# Patient Record
Sex: FEMALE | Race: WHITE | NOT HISPANIC OR LATINO | Employment: UNEMPLOYED | URBAN - NONMETROPOLITAN AREA
[De-identification: names, ages, dates, MRNs, and addresses within clinical notes are randomized per-mention and may not be internally consistent; named-entity substitution may affect disease eponyms.]

---

## 2024-10-06 ENCOUNTER — HOSPITAL ENCOUNTER (EMERGENCY)
Facility: HOSPITAL | Age: 29
Discharge: HOME | End: 2024-10-06
Attending: EMERGENCY MEDICINE

## 2024-10-06 VITALS
WEIGHT: 248 LBS | HEART RATE: 95 BPM | DIASTOLIC BLOOD PRESSURE: 100 MMHG | BODY MASS INDEX: 39.86 KG/M2 | RESPIRATION RATE: 17 BRPM | SYSTOLIC BLOOD PRESSURE: 135 MMHG | OXYGEN SATURATION: 99 % | HEIGHT: 66 IN | TEMPERATURE: 97.5 F

## 2024-10-06 DIAGNOSIS — U07.1 COVID-19: Primary | ICD-10-CM

## 2024-10-06 LAB
FLUAV AG UPPER RESP QL IA.RAPID: NEGATIVE
FLUBV AG UPPER RESP QL IA.RAPID: NEGATIVE
SARS-COV+SARS-COV-2 AG RESP QL IA.RAPID: POSITIVE

## 2024-10-06 PROCEDURE — 96372 THER/PROPH/DIAG INJ SC/IM: CPT

## 2024-10-06 PROCEDURE — 99283 EMERGENCY DEPT VISIT LOW MDM: CPT

## 2024-10-06 PROCEDURE — 99284 EMERGENCY DEPT VISIT MOD MDM: CPT | Performed by: EMERGENCY MEDICINE

## 2024-10-06 PROCEDURE — 87804 INFLUENZA ASSAY W/OPTIC: CPT | Performed by: EMERGENCY MEDICINE

## 2024-10-06 PROCEDURE — 87811 SARS-COV-2 COVID19 W/OPTIC: CPT | Performed by: EMERGENCY MEDICINE

## 2024-10-06 RX ORDER — KETOROLAC TROMETHAMINE 30 MG/ML
15 INJECTION, SOLUTION INTRAMUSCULAR; INTRAVENOUS ONCE
Status: COMPLETED | OUTPATIENT
Start: 2024-10-06 | End: 2024-10-06

## 2024-10-06 RX ADMIN — KETOROLAC TROMETHAMINE 15 MG: 30 INJECTION, SOLUTION INTRAMUSCULAR at 15:26

## 2024-10-06 NOTE — ED PROVIDER NOTES
Final diagnoses:   COVID-19     ED Disposition       ED Disposition   Discharge    Condition   Stable    Date/Time   Sun Oct 6, 2024  3:11 PM    Comment   No Maguire discharge to home/self care.                   Assessment & Plan       Medical Decision Making  Patient presented to the emergency department and a MSE was performed. The patient was evaluated for complaint related to acute viral-like symptoms.  Patient is potentially at risk for, but not limited to, common cold, bronchitis, pneumonia, influenza, COVID.  Several of these diagnoses have been evaluated and ruled out by history and physical.  As needed, patient will be further evaluated with laboratory and imaging studies.  Higher level diagnostics, such as CT imaging or ultrasound, may also be required.  Please see work-up portion of the note for further evaluation of patient's risk.  Socioeconomic factors were also considered as part of the decision-making process.  Unless otherwise stated in the chart or patient is admitted as elsewhere documented, any previously prescribed medications will be maintained.      Problems Addressed:  COVID-19: self-limited or minor problem    Amount and/or Complexity of Data Reviewed  Labs: ordered. Decision-making details documented in ED Course.    Risk  Prescription drug management.        ED Course as of 10/06/24 1514   Sun Oct 06, 2024   1505 SARS COV Rapid Antigen(!): Positive       Medications   ketorolac (TORADOL) injection 15 mg (has no administration in time range)       ED Risk Strat Scores                           SBIRT 22yo+      Flowsheet Row Most Recent Value   Initial Alcohol Screen: US AUDIT-C     1. How often do you have a drink containing alcohol? 0 Filed at: 10/06/2024 1435   2. How many drinks containing alcohol do you have on a typical day you are drinking?  0 Filed at: 10/06/2024 1435   3b. FEMALE Any Age, or MALE 65+: How often do you have 4 or more drinks on one occassion? 0 Filed at:  10/06/2024 1435   Audit-C Score 0 Filed at: 10/06/2024 1435   JESSIE: How many times in the past year have you...    Used an illegal drug or used a prescription medication for non-medical reasons? Never Filed at: 10/06/2024 1435                            History of Present Illness       Chief Complaint   Patient presents with    Flu Symptoms     Yesterday started with flu symptoms -today feeling worse over last day       History reviewed. No pertinent past medical history.   History reviewed. No pertinent surgical history.   History reviewed. No pertinent family history.   Social History     Tobacco Use    Smoking status: Every Day     Current packs/day: 1.00     Types: Cigarettes     Passive exposure: Never    Smokeless tobacco: Never   Vaping Use    Vaping status: Never Used   Substance Use Topics    Alcohol use: Not Currently    Drug use: Not Currently      E-Cigarette/Vaping    E-Cigarette Use Never User       E-Cigarette/Vaping Substances    Nicotine No     Flavoring No       I have reviewed and agree with the history as documented.     29-year-old female presenting to the emergency room reporting that she has been ill since Wednesday with sore throat, congestion, headache no cough.  No nausea vomiting or reported fever.      History provided by:  Patient  Flu Symptoms  Presenting symptoms: cough, fatigue, headache and sore throat    Presenting symptoms: no diarrhea, no nausea, no shortness of breath and no vomiting    Associated symptoms: no congestion        Review of Systems   Constitutional:  Positive for fatigue.   HENT:  Positive for sore throat. Negative for congestion, trouble swallowing and voice change.    Respiratory:  Positive for cough. Negative for shortness of breath.    Gastrointestinal:  Negative for diarrhea, nausea and vomiting.   Neurological:  Positive for headaches.   All other systems reviewed and are negative.          Objective       ED Triage Vitals [10/06/24 1432]   Temperature Pulse  Blood Pressure Respirations SpO2 Patient Position - Orthostatic VS   97.5 °F (36.4 °C) 95 135/100 17 99 % --      Temp Source Heart Rate Source BP Location FiO2 (%) Pain Score    Temporal Monitor Left arm -- 3      Vitals      Date and Time Temp Pulse SpO2 Resp BP Pain Score FACES Pain Rating User   10/06/24 1432 97.5 °F (36.4 °C) 95 99 % 17 135/100 3 -- KM            Physical Exam  Vitals and nursing note reviewed.   Constitutional:       General: She is in acute distress.      Appearance: She is normal weight. She is not ill-appearing or toxic-appearing.   HENT:      Head: Normocephalic and atraumatic.      Right Ear: Tympanic membrane, ear canal and external ear normal. There is no impacted cerumen.      Left Ear: Tympanic membrane, ear canal and external ear normal. There is no impacted cerumen.      Nose: Congestion present.      Mouth/Throat:      Mouth: Mucous membranes are moist.   Eyes:      General:         Right eye: No discharge.         Left eye: No discharge.      Pupils: Pupils are equal, round, and reactive to light.   Pulmonary:      Effort: Pulmonary effort is normal. No respiratory distress.      Breath sounds: No wheezing, rhonchi or rales.   Abdominal:      General: Abdomen is flat. There is no distension.      Tenderness: There is no abdominal tenderness.   Musculoskeletal:         General: No signs of injury.   Skin:     Coloration: Skin is not pale.   Neurological:      General: No focal deficit present.      Mental Status: She is alert.   Psychiatric:         Mood and Affect: Mood normal.         Thought Content: Thought content normal.         Judgment: Judgment normal.         Results Reviewed       Procedure Component Value Units Date/Time    FLU/COVID Rapid Antigen (30 min. TAT) - Preferred screening test in ED [[id number]]  (Abnormal) Collected: 10/06/24 1437    Lab Status: Final result Specimen: Nares from Nose Updated: 10/06/24 1453     SARS COV Rapid Antigen Positive     Influenza A  Rapid Antigen Negative     Influenza B Rapid Antigen Negative    Narrative:      This test has been performed using the Quidel Julienne 2 FLU+SARS Antigen test under the Emergency Use Authorization (EUA). This test has been validated by the  and verified by the performing laboratory. The Julienne uses lateral flow immunofluorescent sandwich assay to detect SARS-COV, Influenza A and Influenza B Antigen.     The Quidel Julienne 2 SARS Antigen test does not differentiate between SARS-CoV and SARS-CoV-2.     Negative results are presumptive and may be confirmed with a molecular assay, if necessary, for patient management. Negative results do not rule out SARS-CoV-2 or influenza infection and should not be used as the sole basis for treatment or patient management decisions. A negative test result may occur if the level of antigen in a sample is below the limit of detection of this test.     Positive results are indicative of the presence of viral antigens, but do not rule out bacterial infection or co-infection with other viruses.     All test results should be used as an adjunct to clinical observations and other information available to the provider.    FOR PEDIATRIC PATIENTS - copy/paste COVID Guidelines URL to browser: [email or web address]            No orders to display       Procedures    ED Medication and Procedure Management   None     Patient's Medications    No medications on file     No discharge procedures on file.  ED SEPSIS DOCUMENTATION   Time reflects when diagnosis was documented in both MDM as applicable and the Disposition within this note       Time User Action Codes Description Comment    10/6/2024  3:12 PM Adi Mcdowell Add [U07.1] COVID-19                  Adi Mcdowell DO  10/06/24 1514

## 2024-10-06 NOTE — Clinical Note
No Maguire was seen and treated in our emergency department on 10/6/2024.                Diagnosis:     No  .    She may return on this date:     Patient should avoid being around others until fever free for 24 hours without the use of ibuprofen or Tylenol     If you have any questions or concerns, please don't hesitate to call.      Adi Mcdowell, DO    ______________________________           _______________          _______________  Hospital Representative                              Date                                Time

## 2025-03-20 ENCOUNTER — HOSPITAL ENCOUNTER (EMERGENCY)
Facility: HOSPITAL | Age: 30
Discharge: HOME | End: 2025-03-20
Attending: STUDENT IN AN ORGANIZED HEALTH CARE EDUCATION/TRAINING PROGRAM
Payer: COMMERCIAL

## 2025-03-20 ENCOUNTER — APPOINTMENT (EMERGENCY)
Dept: RADIOLOGY | Facility: HOSPITAL | Age: 30
End: 2025-03-20
Payer: COMMERCIAL

## 2025-03-20 VITALS
BODY MASS INDEX: 39.05 KG/M2 | OXYGEN SATURATION: 100 % | DIASTOLIC BLOOD PRESSURE: 72 MMHG | WEIGHT: 243 LBS | HEIGHT: 66 IN | TEMPERATURE: 97.3 F | SYSTOLIC BLOOD PRESSURE: 166 MMHG | HEART RATE: 88 BPM | RESPIRATION RATE: 14 BRPM

## 2025-03-20 DIAGNOSIS — M54.2 NECK PAIN ON RIGHT SIDE: ICD-10-CM

## 2025-03-20 DIAGNOSIS — M25.511 ACUTE PAIN OF RIGHT SHOULDER: Primary | ICD-10-CM

## 2025-03-20 PROCEDURE — 99284 EMERGENCY DEPT VISIT MOD MDM: CPT | Performed by: STUDENT IN AN ORGANIZED HEALTH CARE EDUCATION/TRAINING PROGRAM

## 2025-03-20 PROCEDURE — 99283 EMERGENCY DEPT VISIT LOW MDM: CPT

## 2025-03-20 PROCEDURE — 73030 X-RAY EXAM OF SHOULDER: CPT

## 2025-03-20 RX ORDER — METHOCARBAMOL 500 MG/1
500 TABLET, FILM COATED ORAL ONCE
Status: COMPLETED | OUTPATIENT
Start: 2025-03-20 | End: 2025-03-20

## 2025-03-20 RX ORDER — ACETAMINOPHEN 325 MG/1
975 TABLET ORAL ONCE
Status: COMPLETED | OUTPATIENT
Start: 2025-03-20 | End: 2025-03-20

## 2025-03-20 RX ORDER — NAPROXEN 500 MG/1
500 TABLET ORAL 2 TIMES DAILY PRN
Qty: 30 TABLET | Refills: 0 | Status: SHIPPED | OUTPATIENT
Start: 2025-03-20

## 2025-03-20 RX ORDER — METHOCARBAMOL 500 MG/1
500 TABLET, FILM COATED ORAL 2 TIMES DAILY PRN
Qty: 20 TABLET | Refills: 0 | Status: SHIPPED | OUTPATIENT
Start: 2025-03-20

## 2025-03-20 RX ORDER — NAPROXEN 250 MG/1
500 TABLET ORAL ONCE
Status: COMPLETED | OUTPATIENT
Start: 2025-03-20 | End: 2025-03-20

## 2025-03-20 RX ADMIN — METHOCARBAMOL 500 MG: 500 TABLET ORAL at 01:26

## 2025-03-20 RX ADMIN — ACETAMINOPHEN 325MG 975 MG: 325 TABLET ORAL at 01:26

## 2025-03-20 RX ADMIN — NAPROXEN 500 MG: 250 TABLET ORAL at 01:26

## 2025-03-20 NOTE — DISCHARGE INSTRUCTIONS
The x-rays that were obtained did not show any significant abnormalities.    You are being prescribed a course of naproxen (anti-inflammatory) and Robaxin (muscle relaxant).  Take these medications as directed.  In addition to these medications, you can take Tylenol 1000 mg every 6 hours.  Rest your right shoulder.  Start applying warm compresses/heating pad to the right shoulder/trapezius area.    An outpatient referral to sports medicine was provided.  Expect a phone call within the next 2 days to set up the appointment.  Return to the emergency department for any concerning signs or symptoms.

## 2025-03-20 NOTE — ED PROVIDER NOTES
Time reflects when diagnosis was documented in both MDM as applicable and the Disposition within this note       Time User Action Codes Description Comment    3/20/2025  1:40 AM Karson Villanueva [M25.511] Acute pain of right shoulder     3/20/2025  1:42 AM Karson Villanueva [M54.2] Neck pain on right side           ED Disposition       ED Disposition   Discharge    Condition   Stable    Date/Time   Thu Mar 20, 2025  1:41 AM    Comment   No Maguire discharge to home/self care.                   Assessment & Plan       Medical Decision Making  This patient presents with right shoulder/neck pain.   Diagnostic considerations include trapezius spasm, trapezius sprain, dislocation, rotator cuff injury, tendinitis.     Vital signs reviewed. The patient has TTP along the posterior aspect of the right shoulder and trapezius. No overlying skin changes. The patient has full ROM of the right shoulder with pain. XR imaging without acute findings. PRN Naproxen/Robaxin prescribed. OP Sports Medicine referral provided.  Other recommendation/return precautions were discussed with the patient.  All questions addressed.  Stable for discharge.        Problems Addressed:  Acute pain of right shoulder: acute illness or injury  Neck pain on right side: acute illness or injury    Amount and/or Complexity of Data Reviewed  Radiology: ordered and independent interpretation performed.     Details: X-ray imaging interpreted by me.  No acute osseous findings.    Risk  OTC drugs.  Prescription drug management.      Medications   naproxen (NAPROSYN) tablet 500 mg (500 mg Oral Given 3/20/25 0126)   acetaminophen (TYLENOL) tablet 975 mg (975 mg Oral Given 3/20/25 0126)   methocarbamol (ROBAXIN) tablet 500 mg (500 mg Oral Given 3/20/25 0126)       ED Risk Strat Scores        History of Present Illness       Chief Complaint   Patient presents with    Shoulder Pain     Per pt having right shoulder pain; injured her shoulder 2 weeks ago and  pain worsening. Denies fall.       Past Medical History:   Diagnosis Date    Asthma       Past Surgical History:   Procedure Laterality Date    TUBAL LIGATION        History reviewed. No pertinent family history.   Social History     Tobacco Use    Smoking status: Every Day     Current packs/day: 1.00     Types: Cigarettes     Passive exposure: Never    Smokeless tobacco: Never   Vaping Use    Vaping status: Never Used   Substance Use Topics    Alcohol use: Not Currently    Drug use: Not Currently      E-Cigarette/Vaping    E-Cigarette Use Never User       E-Cigarette/Vaping Substances    Nicotine No     Flavoring No       I have reviewed and agree with the history as documented.       History provided by:  Patient  Shoulder Pain  Location:  Shoulder  Shoulder location:  R shoulder  Injury: yes    Time since incident:  2 weeks  Mechanism of injury comment:  Patient was opening a garage door x 2 weeks ago when she developed right shoulder pain. Worsening pain since. No improvement of pain with ASA, Tylenol, ibuprofen. No other injuries.  Pain details:     Radiates to:  R upper arm (neck)    Severity:  Moderate    Onset quality:  Gradual  Handedness:  Right-handed  Dislocation: no    Prior injury to area:  No  Relieved by:  Nothing  Worsened by:  Movement, stretching area and exercise  Ineffective treatments:  NSAIDs and acetaminophen  Associated symptoms: decreased range of motion and neck pain    Associated symptoms: no muscle weakness, no numbness, no swelling and no tingling      Review of Systems   Musculoskeletal:  Positive for arthralgias, neck pain and neck stiffness. Negative for joint swelling.   Skin:  Negative for color change, pallor and rash.     Objective       ED Triage Vitals [03/20/25 0044]   Temperature Pulse Blood Pressure Respirations SpO2 Patient Position - Orthostatic VS   (!) 97.3 °F (36.3 °C) 88 166/72 14 100 % --      Temp Source Heart Rate Source BP Location FiO2 (%) Pain Score    Temporal  Monitor Left arm -- 7      Vitals      Date and Time Temp Pulse SpO2 Resp BP Pain Score FACES Pain Rating User   03/20/25 0126 -- -- -- -- -- 7 --    03/20/25 0044 97.3 °F (36.3 °C) 88 100 % 14 166/72 7 -- NM          Physical Exam  Vitals and nursing note reviewed.   Constitutional:       General: She is not in acute distress.     Appearance: She is not ill-appearing or toxic-appearing.   HENT:      Head: Normocephalic and atraumatic.      Right Ear: External ear normal.      Left Ear: External ear normal.      Nose: No congestion or rhinorrhea.   Eyes:      General: No scleral icterus.        Right eye: No discharge.         Left eye: No discharge.      Extraocular Movements: Extraocular movements intact.      Conjunctiva/sclera: Conjunctivae normal.   Cardiovascular:      Rate and Rhythm: Normal rate and regular rhythm.      Pulses: Normal pulses.      Heart sounds: Normal heart sounds. No murmur heard.  Pulmonary:      Effort: Pulmonary effort is normal. No respiratory distress.      Breath sounds: Normal breath sounds. No wheezing or rhonchi.   Chest:      Chest wall: No tenderness.   Abdominal:      General: Bowel sounds are normal.      Palpations: Abdomen is soft.      Tenderness: There is no abdominal tenderness. There is no guarding or rebound.   Musculoskeletal:         General: Tenderness present. No swelling, deformity or signs of injury.      Cervical back: Normal range of motion and neck supple. Tenderness present.      Right lower leg: No edema.      Left lower leg: No edema.      Comments: Tenderness to palpation along the posterior aspect of the right shoulder and along the right trapezius/neck.  No midline cervical tenderness.  Patient has full active range of motion of the right glenohumeral joint with pain.  The distal aspect of the right upper extremity is neurovascularly intact.   Skin:     General: Skin is warm and dry.      Coloration: Skin is not jaundiced.      Findings: No bruising or  erythema.   Neurological:      Mental Status: She is alert and oriented to person, place, and time.   Psychiatric:         Behavior: Behavior normal.       Results Reviewed       None          XR shoulder 2+ views RIGHT   ED Interpretation by Karson Villanueva DO (03/20 0140)   No acute osseous abnormalities.        Procedures    ED Medication and Procedure Management   None     Discharge Medication List as of 3/20/2025  1:43 AM        START taking these medications    Details   methocarbamol (ROBAXIN) 500 mg tablet Take 1 tablet (500 mg total) by mouth 2 (two) times a day as needed for muscle spasms, Starting Thu 3/20/2025, Normal      naproxen (Naprosyn) 500 mg tablet Take 1 tablet (500 mg total) by mouth 2 (two) times a day as needed for mild pain, Starting Thu 3/20/2025, Normal             ED SEPSIS DOCUMENTATION   Time reflects when diagnosis was documented in both MDM as applicable and the Disposition within this note       Time User Action Codes Description Comment    3/20/2025  1:40 AM Karson Villanueva [M25.511] Acute pain of right shoulder     3/20/2025  1:42 AM Karson Villanueva [M54.2] Neck pain on right side                  Karson Villanueva DO  03/20/25 0237

## 2025-03-24 ENCOUNTER — OFFICE VISIT (OUTPATIENT)
Dept: OBGYN CLINIC | Facility: CLINIC | Age: 30
End: 2025-03-24
Payer: COMMERCIAL

## 2025-03-24 VITALS — HEIGHT: 67 IN | WEIGHT: 248.2 LBS | BODY MASS INDEX: 38.96 KG/M2

## 2025-03-24 DIAGNOSIS — M25.511 ACUTE PAIN OF RIGHT SHOULDER: ICD-10-CM

## 2025-03-24 DIAGNOSIS — S46.911A STRAIN OF RIGHT SHOULDER, INITIAL ENCOUNTER: Primary | ICD-10-CM

## 2025-03-24 PROCEDURE — 99204 OFFICE O/P NEW MOD 45 MIN: CPT | Performed by: ORTHOPAEDIC SURGERY

## 2025-03-24 RX ORDER — TOPIRAMATE 25 MG/1
TABLET, FILM COATED ORAL
COMMUNITY
Start: 2025-03-04

## 2025-03-24 RX ORDER — BUSPIRONE HYDROCHLORIDE 5 MG/1
TABLET ORAL
COMMUNITY

## 2025-03-24 RX ORDER — FLUOXETINE 10 MG/1
10 CAPSULE ORAL DAILY
COMMUNITY
Start: 2025-02-05

## 2025-03-24 NOTE — LETTER
March 24, 2025     Patient: No Maguire  YOB: 1995  Date of Visit: 3/24/2025      To Whom it May Concern:    No Maguire is under my professional care. No was seen in my office on 3/24/2025. No may return to work on 3/24/2025.  She is permitted to use the right upper extremity below shoulder height and lift up to 10 pounds with the right upper extremity.  Otherwise, no use of the right upper extremity above shoulder height no lifting greater than 10 pounds.  This restriction remains in effect until she is rechecked in 2 weeks .    If you have any questions or concerns, please don't hesitate to call.         Sincerely,          Lawrence Huerta DO        CC: No Recipients

## 2025-03-24 NOTE — PROGRESS NOTES
Assessment/Plan:  Assessment & Plan   Diagnoses and all orders for this visit:    Strain of right shoulder, initial encounter  -     Ambulatory referral to Physical Therapy; Future    Acute pain of right shoulder  -     Ambulatory Referral to Orthopedic Surgery  -     Ambulatory referral to Physical Therapy; Future    Other orders  -     busPIRone (BUSPAR) 5 mg tablet; take 1 tablet by mouth every morning and BEFORE BEDTIME  -     FLUoxetine (PROzac) 10 mg capsule; Take 10 mg by mouth daily  -     topiramate (TOPAMAX) 25 mg tablet; Take 1 tablet daily for one week, then 1 tablet twice a day.        Plan: I would recommend physical therapy.  The injury occurred at work and I have instructed her to file a Workmen's Comp. claim and begin physical therapy as soon as she is able to do so.  I will see her in 2 weeks.  She should continue the naproxen and if she runs out of the medication provided by the emergency room, she is to contact the office and a prescription will be provided.  Work restrictions were discussed and a note provided.    Subjective:   Patient ID: No Maguire is a 29 y.o. female.    SALVADOR Sarabia is a 29-year-old right-hand-dominant female who injured her shoulder while working about 2 weeks ago.  She is unsure of the exact day.  She states that she was working with her boss trying to lift a garage door, the door was not functioning properly and she caused pain in her right shoulder.  She describes posterior shoulder pain radiating to the posterior aspect of the upper arm.  Pain occurs especially with elevation of the arm above shoulder height.  Symptoms progressively worsened and she presented to the emergency room at [facility] on 3/20/2025.  X-rays were obtained, she was prescribed Robaxin and Naprosyn and recommended to seek orthopedic follow-up.  She had been working prior to the emergency room visit but has been out of work since that time at the recommendation of the emergency room  staff.  She denies any history of prior shoulder complaints.  She denies upper extremity paresthesias.  She denies any additional injuries.  Pain will radiate distally to the proximal forearm but not further.  She denies midline neck pain and denies any left-sided upper extremity complaints.    The following portions of the patient's history were reviewed and updated as appropriate: allergies, current medications, past family history, past medical history, past social history, past surgical history, and problem list.    Review of Systems: The patient's 10 organ system review is negative excluding the chief complaint and as it is consistent with her past medical history.    Objective:  Ortho Exam  The right shoulder exam demonstrates excellent range of motion equal to the contralateral side but with pain during elevation of the arm above shoulder height especially in abduction.  She has 5/5 strength of internal rotation, external rotation and abduction without complaints.  Jobes test elicited pain radiating down the posterior aspect of her arm to the mid aspect of the humerus.  Charlotte's test is negative.  She does have some tenderness in the supraspinatus muscle belly.  The periscapular muscles were nontender.  The AC joint, rotator cuff and biceps tendon are nontender.  Cervical spine demonstrates good range of motion but she does complain of right-sided trapezial pain with left rotation.  She has similar symptoms with forward flexion and extension of the cervical spine.  She had no complaints with right rotation or side bending.  Spurling's and Lhermitte's maneuvers elicited no radicular complaints.  The midline cervical spine is nontender.  Paraspinal muscles are nontender.  The remainder of the upper extremity examination bilaterally is benign.    Motor and sensory exams are intact.  Deep tendon reflexes are 1/4 for both upper extremities.  Physical Exam    X-rays of the shoulder demonstrated no acute  abnormality.    The x-ray report was reviewed.    The ER note was reviewed.

## 2025-04-07 DIAGNOSIS — M25.511 ACUTE PAIN OF RIGHT SHOULDER: ICD-10-CM

## 2025-04-07 RX ORDER — NAPROXEN 500 MG/1
500 TABLET ORAL 2 TIMES DAILY PRN
Qty: 60 TABLET | Refills: 1 | Status: SHIPPED | OUTPATIENT
Start: 2025-04-07

## 2025-04-09 ENCOUNTER — OFFICE VISIT (OUTPATIENT)
Dept: OBGYN CLINIC | Facility: CLINIC | Age: 30
End: 2025-04-09
Payer: COMMERCIAL

## 2025-04-09 VITALS — WEIGHT: 248 LBS | BODY MASS INDEX: 39.86 KG/M2 | HEIGHT: 66 IN

## 2025-04-09 DIAGNOSIS — M25.511 ACUTE PAIN OF RIGHT SHOULDER: ICD-10-CM

## 2025-04-09 DIAGNOSIS — S46.911D STRAIN OF RIGHT SHOULDER, SUBSEQUENT ENCOUNTER: Primary | ICD-10-CM

## 2025-04-09 PROCEDURE — 99213 OFFICE O/P EST LOW 20 MIN: CPT | Performed by: ORTHOPAEDIC SURGERY

## 2025-04-09 NOTE — LETTER
April 9, 2025     Patient: No Maguire  YOB: 1995  Date of Visit: 4/9/2025      To Whom it May Concern:    No Maguire is under my professional care. No was seen in my office on 4/9/2025. No is not permitted to use the right upper extremity above shoulder height at any time.  She is not permitted to push, pull or lift more than 10 pounds with the right upper extremity below shoulder height.  This restriction remains in effect until she is rechecked in 3 weeks.    If you have any questions or concerns, please don't hesitate to call.         Sincerely,          Lawrence Huerta DO        CC: No Recipients

## 2025-04-09 NOTE — PROGRESS NOTES
"Name: No Maguire      : 1995      MRN: [id number]  Encounter Provider: Lawrence Huerta DO  Encounter Date: 2025   Encounter department: [facility] [place]  :  Assessment & Plan  Strain of right shoulder, subsequent encounter  I would recommend follow-up in 3 weeks.  She has returned and discussed her injury with her employer.  However, she has not yet had a definitive answer in regards to her Workmen's Comp. injury status.  She has personally scheduled herself for physical therapy for 2025.  I would recommend she begin physical therapy as already scheduled.  She was given a note with restrictions as discussed and provided to her today.  The office should be contacted if questions or concerns arise.       Acute pain of right shoulder             History of Present Illness   HPI  No Maguire is a 29 y.o. female who presents for reevaluation of her right shoulder.  She continues to pursue her Workmen's Comp. 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 claim status.  She has been working in a limited duty capacity.  However, she finds that pushing and pulling large crates that way a significant amount cause aggravation of her shoulder pain.  She continues to complain of shoulder pain but this now seems to be primarily posterior in location.  She denies pain radiating distally and denies any upper extremity paresthesias.    Objective   Ht 5' 6\" (1.676 m)   Wt 112 kg (248 lb)   LMP 2025   BMI 40.03 kg/m²      Physical Exam  The right shoulder exam demonstrates excellent range of motion with complaints of posterior pain during abduction.  She has excellent strength of internal rotation, external rotation and abduction without complaints.  She has negative abdominal compression and liftoff test.  She has slight tenderness " over the posterior shoulder capsule and soft tissues.  The infraspinatus and supraspinatus muscle bellies are also slightly tender.  Rotator cuff insertion, AC joint and anterior shoulder are nontender.  She has mild posterior pain during Jobes maneuver and negative Chenango's testing.  Cervical spine exam is benign.

## 2025-04-09 NOTE — ASSESSMENT & PLAN NOTE
I would recommend follow-up in 3 weeks.  She has returned and discussed her injury with her employer.  However, she has not yet had a definitive answer in regards to her Workmen's Comp. injury status.  She has personally scheduled herself for physical therapy for 4/14/2025.  I would recommend she begin physical therapy as already scheduled.  She was given a note with restrictions as discussed and provided to her today.  The office should be contacted if questions or concerns arise.

## 2025-04-15 ENCOUNTER — TELEPHONE (OUTPATIENT)
Age: 30
End: 2025-04-15

## 2025-04-15 NOTE — TELEPHONE ENCOUNTER
Caller: Argenis from [facility] [employer] [facility]    Doctor: Dr. Huerta    Reason for call: Argenis calling to check on work status of patient for workman's comp claim.  Advised patient was seen but no workman's compensation information has been provided to us at this time.  Argenis going to reach out to [facility] for status of claim.     Call back#:  ext [phone]